# Patient Record
Sex: FEMALE | Race: WHITE | NOT HISPANIC OR LATINO
[De-identification: names, ages, dates, MRNs, and addresses within clinical notes are randomized per-mention and may not be internally consistent; named-entity substitution may affect disease eponyms.]

---

## 2023-03-10 ENCOUNTER — NON-APPOINTMENT (OUTPATIENT)
Age: 28
End: 2023-03-10

## 2023-03-16 ENCOUNTER — NON-APPOINTMENT (OUTPATIENT)
Age: 28
End: 2023-03-16

## 2023-03-17 ENCOUNTER — TRANSCRIPTION ENCOUNTER (OUTPATIENT)
Age: 28
End: 2023-03-17

## 2023-03-17 ENCOUNTER — APPOINTMENT (OUTPATIENT)
Dept: SURGERY | Facility: CLINIC | Age: 28
End: 2023-03-17
Payer: COMMERCIAL

## 2023-03-17 VITALS
TEMPERATURE: 97.1 F | HEART RATE: 62 BPM | OXYGEN SATURATION: 98 % | HEIGHT: 67 IN | WEIGHT: 212 LBS | BODY MASS INDEX: 33.27 KG/M2 | DIASTOLIC BLOOD PRESSURE: 70 MMHG | SYSTOLIC BLOOD PRESSURE: 102 MMHG

## 2023-03-17 DIAGNOSIS — K43.9 VENTRAL HERNIA W/OUT OBSTRUCTION OR GANGRENE: ICD-10-CM

## 2023-03-17 DIAGNOSIS — O16.9 UNSPECIFIED MATERNAL HYPERTENSION, UNSPECIFIED TRIMESTER: ICD-10-CM

## 2023-03-17 DIAGNOSIS — Z78.9 OTHER SPECIFIED HEALTH STATUS: ICD-10-CM

## 2023-03-17 DIAGNOSIS — Z87.19 PERSONAL HISTORY OF OTHER DISEASES OF THE DIGESTIVE SYSTEM: ICD-10-CM

## 2023-03-17 PROCEDURE — 99205 OFFICE O/P NEW HI 60 MIN: CPT

## 2023-03-20 PROBLEM — K43.9 VENTRAL HERNIA: Noted: 2023-03-17

## 2023-03-20 PROBLEM — Z87.19 HISTORY OF UMBILICAL HERNIA: Status: RESOLVED | Noted: 2023-03-17 | Resolved: 2023-03-20

## 2023-03-20 RX ORDER — NORGESTIMATE AND ETHINYL ESTRADIOL 7DAYSX3 LO
0.18/0.215/0.25 KIT ORAL
Qty: 84 | Refills: 0 | Status: ACTIVE | COMMUNITY
Start: 2023-03-13

## 2023-03-20 NOTE — REVIEW OF SYSTEMS
[Feeling Poorly] : not feeling poorly [Feeling Tired] : not feeling tired [As Noted in HPI] : as noted in HPI [Anxiety] : anxiety [Depression] : no depression [Negative] : Heme/Lymph [de-identified] : regarding this issue and visit...

## 2023-03-20 NOTE — PHYSICAL EXAM
[Respiratory Effort] : normal respiratory effort [Normal Rate and Rhythm] : normal rate and rhythm [No HSM] : no hepatosplenomegaly [Tender] : was nontender [Enlarged] : not enlarged [No Rash or Lesion] : No rash or lesion [Alert] : alert [Oriented to Person] : oriented to person [Oriented to Place] : oriented to place [Oriented to Time] : oriented to time [Anxious] : anxious [de-identified] : Appears well, no acute distress, ambulates easily into office and assumes examination table without need of assistance.  [de-identified] : Normocephalic and atraumatic.  [de-identified] : Supple with full range of motion.  [FreeTextEntry1] : No cervical, supraclavicular, axillary or inguinal adenopathy.  [de-identified] : Deferred.  [de-identified] : Epigastrium with mild diastasis.\par Periumbilical fascia with small, but incarcerated though seemingly fat containing ventral hernia.\par No overlying acute inflammatory changes.\par However, hernia tender to extended reduction efforts.\par Lower abdomen unremarkable except for redundant skin and soft tissue.\par Bilateral groins intact throughout inguinal/ femoral/ Spigelian spaces. [de-identified] : Normal external genitalia. [de-identified] : Deferred.  [de-identified] : Grossly symmetric and within normal limits without motor or sensory deficits.  [de-identified] : though appropriately so and quite pleasant overall.

## 2023-03-20 NOTE — REASON FOR VISIT
[Initial Evaluation] : an initial evaluation [Spouse] : spouse [FreeTextEntry1] : 27 year old female being seen for initial evaluation of umbilical hernia

## 2023-03-20 NOTE — HISTORY OF PRESENT ILLNESS
[de-identified] : 27 year old female presents today with concern of sensitivity and pain in the umbilicus after giving birth 7 weeks ago. Pain described as intermittent, dull, non radiating aggravated with movements while strength training and when coughing/ sneezing. Tolerating solids and liquids well. No nausea, vomiting, diarrhea or constipation. Patient believes the first pregnancy 2 years ago initiated the onset of the umbilical hernia .

## 2023-03-20 NOTE — PLAN
[FreeTextEntry1] : Symptomatic and incarcerated (seemingly fat containing) ventral hernia.  \par \par Given the associated discomfort/ pain, limitation in activities as well as her understandable anxiety with the risk of further progression in size or strangulation, at least consideration of operative intervention seems indicated and appropriate.\par \par The risks, benefits and nature of the operative intervention have been reviewed at length with Ms. LAKE , including but not limited to the possibility of bowel injury or enterotomy, the possibility of utilization of a permanent mesh, the possibility of infection which could require the future removal or replacement of any mesh, the possibility of future hernia recurrence, the approximate size and location of the estimated or typical operative incision, the likelihood of a visually noticeable “scar” and the possibility of residual abdominal wall deformity.  Ms. LAKE  is aware that all abdominal surgery maintains the possibilities of future abdominal wall reherniation, future intra-abdominal adhesions and increased risk during future operative interventions.  Ms. LAKE  is aware that medical complications unrelated to the specific operative procedure such as cardiopulmonary issues, deep venous thrombosis/ pulmonary embolism and urinary retention are possible.  Ms. LAKE  is aware that general anesthesia will be required and that appropriate care may allow for outpatient treatment or require a transition to inpatient care.\par \par Ms. LAKE  demonstrates good understanding and remains eager to proceed.  While there are no further specific concerns presently, I have encouraged follow-up with me and my office at any time in the interim to discuss any future questions.  Given the incarcerated nature of her hernia, will obtain preoperative CT scan of the abdomen and pelvis to further delineate the content and anatomy to assist in operative planning.  This operative intervention can follow any standard pre-surgical testing or medical clearance as required by the appropriate facility.\par \par Please note that more than 50% of face to face time was spent in counseling and coordination of care.\par \par \par

## 2023-03-25 ENCOUNTER — APPOINTMENT (OUTPATIENT)
Dept: CT IMAGING | Facility: CLINIC | Age: 28
End: 2023-03-25
Payer: COMMERCIAL

## 2023-03-25 ENCOUNTER — OUTPATIENT (OUTPATIENT)
Dept: OUTPATIENT SERVICES | Facility: HOSPITAL | Age: 28
LOS: 1 days | End: 2023-03-25
Payer: COMMERCIAL

## 2023-03-25 DIAGNOSIS — K43.6 OTHER AND UNSPECIFIED VENTRAL HERNIA WITH OBSTRUCTION, WITHOUT GANGRENE: ICD-10-CM

## 2023-03-25 PROCEDURE — 74177 CT ABD & PELVIS W/CONTRAST: CPT | Mod: 26

## 2023-03-25 PROCEDURE — 74177 CT ABD & PELVIS W/CONTRAST: CPT

## 2023-03-28 ENCOUNTER — NON-APPOINTMENT (OUTPATIENT)
Age: 28
End: 2023-03-28

## 2023-04-12 ENCOUNTER — APPOINTMENT (OUTPATIENT)
Dept: SURGERY | Facility: CLINIC | Age: 28
End: 2023-04-12
Payer: COMMERCIAL

## 2023-04-12 VITALS
HEART RATE: 83 BPM | HEIGHT: 67 IN | WEIGHT: 212 LBS | DIASTOLIC BLOOD PRESSURE: 80 MMHG | BODY MASS INDEX: 33.27 KG/M2 | TEMPERATURE: 98.1 F | OXYGEN SATURATION: 98 % | SYSTOLIC BLOOD PRESSURE: 120 MMHG

## 2023-04-12 PROCEDURE — 99215 OFFICE O/P EST HI 40 MIN: CPT

## 2023-04-12 NOTE — HISTORY OF PRESENT ILLNESS
[de-identified] : 27 year old female presents today with concern of sensitivity and pain in the umbilicus after giving birth 7 weeks ago. Pain described as intermittent, dull, non radiating aggravated with movements while strength training and when coughing/ sneezing. Tolerating solids and liquids well. No nausea, vomiting, diarrhea or constipation. Patient believes the first pregnancy 2 years ago initiated the onset of the umbilical hernia .  [de-identified] : Very pleasant young lady returning to the office with her  under my direction.\par Ms. Rea has a previously diagnosed umbilical hernia.\par She went for a CT scan of the abdomen and pelvis following our initial consultation.\par Ms. Rea continues to complain of low grade but persistent localized discomfort.\par She continues to deny any obviously associated systemic, GI or  complaints...

## 2023-04-12 NOTE — PLAN
[FreeTextEntry1] : Symptomatic and incarcerated ventral hernia- CT scan report and images personally reviewed and discussed with patient and  in detail.  While a cyst is possible, I suspect a small chronically incarcerated ventral hernia with either retained peritoneal fluid or reactive fluid from chronic incarceration.  \par \par Given the associated discomfort, limitation in activity as well as her understandable anxiety with the risk of further persistence, consideration of operative intervention seems appropriate.\par \par The risks, benefits and nature of the operation have been reviewed at length with Ms. LAKE , including but not limited to the possibility of bowel injury or enterotomy, the possibility of utilization of a permanent mesh, the possibility of infection which could require the future removal or replacement of any mesh, the possibility of future hernia recurrence, the approximate size and location of the estimated or typical operative incision, the likelihood of a visually noticeable “scar” and the possibility of residual abdominal wall deformity.  \par \par Ms. LAKE  is aware that all abdominal surgery maintains the possibilities of future abdominal wall reherniation, future intra-abdominal adhesions and increased risk during future operative interventions.  Ms. LAKE  is aware that medical complications unrelated to the specific operative procedure such as cardiopulmonary issues, deep venous thrombosis/ pulmonary embolism and urinary retention are possible.  \par \par Ms. LAKE  is aware that general anesthesia will be required and that appropriate care may allow for outpatient treatment or require a transition to inpatient care.\par \par She understands that all excised tissue will be submitted to Pathology which may be of further future use in resolving the concern for hernia versus cyst.\par \par Ms. LAKE  demonstrates good understanding and remains eager to proceed.  She specifically defers further observation with watchful waiting.\par \par While she and her  have no further specific concerns presently, I have encouraged follow-up with me and my office at any time in the interim to discuss any future questions.  \par \par This operative intervention can follow any standard pre-surgical testing or medical clearance as required by the appropriate facility.\par \par Please note that more than 50% of face to face time was spent in counseling and coordination of care.\par \par \par

## 2023-04-12 NOTE — REASON FOR VISIT
[Follow-Up: _____] : a [unfilled] follow-up visit [Spouse] : spouse [FreeTextEntry1] : 27 year old female being seen for initial evaluation of umbilical hernia

## 2023-04-12 NOTE — DATA REVIEWED
[FreeTextEntry1] : EXAM: [id number] - CT ABDOMEN AND PELVIS IC  - ORDERED BY: ARSALAN CHAVIRA\par \par PROCEDURE DATE:  03/25/2023  \par  \par INTERPRETATION:  CLINICAL INFORMATION: Incarcerated Ventral Hernia.; \par Ordering Dxs: K43.6 Other and unspecified ventral hernia with \par obstruction, without gangrene /// Admitting Dxs: K43.6 OTHER AND UNSP \par VENTRAL HERNIA WITH OBSTRUCTION, W/O GANGRENE QCT\par \par COMPARISON: None.\par \par CONTRAST/COMPLICATIONS:\par IV Contrast: Omnipaque 350  90 cc administered   10 cc discarded\par Oral Contrast: Smoothie Readi-Cat 2\par Complications: None reported at time of study completion\par \par PROCEDURE:\par CT of the Abdomen and Pelvis was performed.\par Sagittal and coronal reformats were performed.\par \par FINDINGS:\par LOWER CHEST: Within normal limits.\par LIVER: Within normal limits.\par BILE DUCTS: Normal caliber.\par GALLBLADDER: Within normal limits.\par SPLEEN: Within normal limits.\par PANCREAS: Within normal limits.\par ADRENALS: Within normal limits.\par KIDNEYS/URETERS: Within normal limits.\par BLADDER: Within normal limits.\par REPRODUCTIVE ORGANS: Within normal limits.\par BOWEL: No bowel obstruction. The appendix is normal.\par PERITONEUM: No ascites.\par VESSELS:  Within normal limits.\par RETROPERITONEUM/LYMPH NODES: No lymphadenopathy.\par ABDOMINAL WALL: There is a 1.8 x 1.4 cm umbilical cyst. There is a \par subjacent 4 mm cyst along the surface of the peritoneum on series 2 image \par 81.\par BONES: Within normal limits.\par \par IMPRESSION: A small umbilical cyst. A subjacent 4 mm cyst at the \par peritoneum. These findings are of uncertain clinical significance.\par \par No ventral hernia.\par \par --- End of Report ---\par \par BRIANNA WALKER MD; Attending Radiologist \par This document has been electronically signed. Mar 28 2023  1:39PM\par

## 2023-04-12 NOTE — REVIEW OF SYSTEMS
[Feeling Poorly] : not feeling poorly [Feeling Tired] : not feeling tired [As Noted in HPI] : as noted in HPI [Anxiety] : anxiety [Depression] : no depression [Negative] : Heme/Lymph [de-identified] : regarding this issue and visit, albeit less so...

## 2023-04-12 NOTE — PHYSICAL EXAM
[Respiratory Effort] : normal respiratory effort [Normal Rate and Rhythm] : normal rate and rhythm [No HSM] : no hepatosplenomegaly [Tender] : was nontender [Enlarged] : not enlarged [No Rash or Lesion] : No rash or lesion [Alert] : alert [Oriented to Person] : oriented to person [Oriented to Place] : oriented to place [Oriented to Time] : oriented to time [Anxious] : anxious [de-identified] : Appears well, no acute distress, ambulates easily into the office.  [de-identified] : Remains normocephalic and atraumatic.  [de-identified] : Still supple with full range of motion.  [FreeTextEntry1] : No palpable cervical, supraclavicular, axillary or inguinal adenopathy.  [de-identified] : Deferred.  [de-identified] : Epigastrium with mild diastasis- stable.\par Periumbilical fascia with small, but incarcerated though seemingly fat containing ventral hernia versus cystic collection.\par No overlying acute inflammatory change.\par However, hernia +/ collection tender to extended reduction efforts.\par Lower abdomen unremarkable except for redundant skin and soft tissue.\par Bilateral groins intact on Valsalva. [de-identified] : Deferred.  [de-identified] : Deferred.  [de-identified] : Grossly symmetric and within normal limits without motor or sensory deficit.  [de-identified] : though less so and quite pleasant.

## 2023-05-05 ENCOUNTER — OUTPATIENT (OUTPATIENT)
Dept: OUTPATIENT SERVICES | Facility: HOSPITAL | Age: 28
LOS: 1 days | End: 2023-05-05
Payer: COMMERCIAL

## 2023-05-05 VITALS
WEIGHT: 214.95 LBS | OXYGEN SATURATION: 98 % | TEMPERATURE: 97 F | DIASTOLIC BLOOD PRESSURE: 80 MMHG | HEIGHT: 67 IN | SYSTOLIC BLOOD PRESSURE: 123 MMHG

## 2023-05-05 DIAGNOSIS — K43.6 OTHER AND UNSPECIFIED VENTRAL HERNIA WITH OBSTRUCTION, WITHOUT GANGRENE: ICD-10-CM

## 2023-05-05 DIAGNOSIS — Z01.818 ENCOUNTER FOR OTHER PREPROCEDURAL EXAMINATION: ICD-10-CM

## 2023-05-05 LAB
HCT VFR BLD CALC: 38.5 % — SIGNIFICANT CHANGE UP (ref 34.5–45)
HGB BLD-MCNC: 13 G/DL — SIGNIFICANT CHANGE UP (ref 11.5–15.5)
MCHC RBC-ENTMCNC: 29 PG — SIGNIFICANT CHANGE UP (ref 27–34)
MCHC RBC-ENTMCNC: 33.8 GM/DL — SIGNIFICANT CHANGE UP (ref 32–36)
MCV RBC AUTO: 85.7 FL — SIGNIFICANT CHANGE UP (ref 80–100)
NRBC # BLD: 0 /100 WBCS — SIGNIFICANT CHANGE UP (ref 0–0)
PLATELET # BLD AUTO: 207 K/UL — SIGNIFICANT CHANGE UP (ref 150–400)
RBC # BLD: 4.49 M/UL — SIGNIFICANT CHANGE UP (ref 3.8–5.2)
RBC # FLD: 12.7 % — SIGNIFICANT CHANGE UP (ref 10.3–14.5)
WBC # BLD: 6.22 K/UL — SIGNIFICANT CHANGE UP (ref 3.8–10.5)
WBC # FLD AUTO: 6.22 K/UL — SIGNIFICANT CHANGE UP (ref 3.8–10.5)

## 2023-05-05 PROCEDURE — G0463: CPT

## 2023-05-05 PROCEDURE — 36415 COLL VENOUS BLD VENIPUNCTURE: CPT

## 2023-05-05 PROCEDURE — 85027 COMPLETE CBC AUTOMATED: CPT

## 2023-05-05 NOTE — H&P PST ADULT - ASSESSMENT
26 y/o female with ventral hernia  Planned surgtery.-open repair Ventral hernia     Pre op instructions provided  Instructions provided on medications to continue and to take the day morning of surgery

## 2023-05-05 NOTE — H&P PST ADULT - CONSTITUTIONAL
Medicare Wellness Visit patient outreach outcome:  Attempted outreach and message left     Kade Caldwell  [facility] Asst  [phone]     well-groomed

## 2023-05-12 ENCOUNTER — TRANSCRIPTION ENCOUNTER (OUTPATIENT)
Age: 28
End: 2023-05-12

## 2023-05-12 NOTE — ASU DISCHARGE PLAN (ADULT/PEDIATRIC) - NS MD DC FALL RISK RISK
For information on Fall & Injury Prevention, visit: [email or web address] OR  [email or web address] OR  https://www.cdc.gov/steadi/patient.html

## 2023-05-12 NOTE — ASU DISCHARGE PLAN (ADULT/PEDIATRIC) - ASU DC SPECIAL INSTRUCTIONSFT
REST and relax!    Apply ice packs for 30 minutes on/30 minutes off every hour while awake for next 48 hours.  May take Tylenol for minor pain.  Please minimize any Aspirin, Advil or Motrin for the next 48 hours.  A prescription for pain medication has been forwarded to your pharmacy.  Take an over-the-counter stool softener. like COLACE. twice daily to prevent constipation.  You may shower and remove any outer dressings after 48 hours, though the Steri-Strips should remain in place.    Call for any questions or concerns! REST and relax!    Apply ice packs for 30 minutes on/ 30 minutes off every hour while awake for next 48 hours.  May take Tylenol for minor pain.  May take Advil or Motrin for moderate pain.  A script for pain medication has been forwarded to your pharmacy.  Please call office for uncontrolled pain.  Take an over the counter stool softener like COLACE twice daily to prevent constipation.  You may shower and remove any outer dressings after 48 hours.   Leave Steri Strips in place.  Call for any questions or concerns!

## 2023-05-12 NOTE — ASU DISCHARGE PLAN (ADULT/PEDIATRIC) - PROVIDER TOKENS
PROVIDER:[TOKEN:[[id number]],FOLLOWUP:[1 week]] PROVIDER:[TOKEN:[[id number]],FOLLOWUP:[2 weeks]]

## 2023-05-12 NOTE — ASU DISCHARGE PLAN (ADULT/PEDIATRIC) - CARE PROVIDER_API CALL
Edmond Brennna)  Surgery  [address], [place] [id number]  Phone: [phone]  Fax: [phone]  Follow Up Time: 1 week   Edmond Brennan)  Surgery  [address], [place] [id number]  Phone: [phone]  Fax: [phone]  Follow Up Time: 2 weeks

## 2023-05-22 ENCOUNTER — TRANSCRIPTION ENCOUNTER (OUTPATIENT)
Age: 28
End: 2023-05-22

## 2023-05-23 ENCOUNTER — RESULT REVIEW (OUTPATIENT)
Age: 28
End: 2023-05-23

## 2023-05-23 ENCOUNTER — OUTPATIENT (OUTPATIENT)
Dept: OUTPATIENT SERVICES | Facility: HOSPITAL | Age: 28
LOS: 1 days | End: 2023-05-23
Payer: COMMERCIAL

## 2023-05-23 ENCOUNTER — APPOINTMENT (OUTPATIENT)
Dept: SURGERY | Facility: HOSPITAL | Age: 28
End: 2023-05-23

## 2023-05-23 VITALS
WEIGHT: 213.85 LBS | RESPIRATION RATE: 17 BRPM | DIASTOLIC BLOOD PRESSURE: 64 MMHG | HEART RATE: 68 BPM | HEIGHT: 67 IN | TEMPERATURE: 98 F | OXYGEN SATURATION: 100 % | SYSTOLIC BLOOD PRESSURE: 124 MMHG

## 2023-05-23 VITALS — SYSTOLIC BLOOD PRESSURE: 120 MMHG | DIASTOLIC BLOOD PRESSURE: 67 MMHG

## 2023-05-23 DIAGNOSIS — K43.6 OTHER AND UNSPECIFIED VENTRAL HERNIA WITH OBSTRUCTION, WITHOUT GANGRENE: ICD-10-CM

## 2023-05-23 LAB — HCG UR QL: NEGATIVE — SIGNIFICANT CHANGE UP

## 2023-05-23 PROCEDURE — 49592 RPR AA HRN 1ST < 3 NCR/STRN: CPT

## 2023-05-23 PROCEDURE — 88302 TISSUE EXAM BY PATHOLOGIST: CPT | Mod: 26

## 2023-05-23 PROCEDURE — 88302 TISSUE EXAM BY PATHOLOGIST: CPT

## 2023-05-23 PROCEDURE — 49592 RPR AA HRN 1ST < 3 NCR/STRN: CPT | Mod: AS

## 2023-05-23 PROCEDURE — 81025 URINE PREGNANCY TEST: CPT

## 2023-05-23 RX ORDER — OXYCODONE HYDROCHLORIDE 5 MG/1
1 TABLET ORAL
Qty: 30 | Refills: 0
Start: 2023-05-23

## 2023-05-23 RX ORDER — NORGESTIMATE AND ETHINYL ESTRADIOL 7DAYSX3 LO
1 KIT ORAL
Refills: 0 | DISCHARGE

## 2023-05-23 RX ORDER — HYDROMORPHONE HYDROCHLORIDE 2 MG/ML
0.25 INJECTION INTRAMUSCULAR; INTRAVENOUS; SUBCUTANEOUS
Refills: 0 | Status: DISCONTINUED | OUTPATIENT
Start: 2023-05-23 | End: 2023-05-24

## 2023-05-23 RX ORDER — CEFAZOLIN SODIUM 1 G
2000 VIAL (EA) INJECTION ONCE
Refills: 0 | Status: COMPLETED | OUTPATIENT
Start: 2023-05-23 | End: 2023-05-23

## 2023-05-23 RX ORDER — SODIUM CHLORIDE 9 MG/ML
1000 INJECTION, SOLUTION INTRAVENOUS
Refills: 0 | Status: DISCONTINUED | OUTPATIENT
Start: 2023-05-23 | End: 2023-05-24

## 2023-05-23 RX ORDER — CHLORHEXIDINE GLUCONATE 213 G/1000ML
1 SOLUTION TOPICAL ONCE
Refills: 0 | Status: COMPLETED | OUTPATIENT
Start: 2023-05-23 | End: 2023-05-23

## 2023-05-23 RX ORDER — HYDROMORPHONE HYDROCHLORIDE 2 MG/ML
0.5 INJECTION INTRAMUSCULAR; INTRAVENOUS; SUBCUTANEOUS
Refills: 0 | Status: DISCONTINUED | OUTPATIENT
Start: 2023-05-23 | End: 2023-05-24

## 2023-05-23 RX ORDER — ONDANSETRON 8 MG/1
4 TABLET, FILM COATED ORAL ONCE
Refills: 0 | Status: DISCONTINUED | OUTPATIENT
Start: 2023-05-23 | End: 2023-05-24

## 2023-05-23 RX ADMIN — SODIUM CHLORIDE 75 MILLILITER(S): 9 INJECTION, SOLUTION INTRAVENOUS at 16:40

## 2023-05-23 RX ADMIN — CHLORHEXIDINE GLUCONATE 1 APPLICATION(S): 213 SOLUTION TOPICAL at 13:51

## 2023-05-23 NOTE — BRIEF OPERATIVE NOTE - NSICDXBRIEFPROCEDURE_GEN_ALL_CORE_FT
PROCEDURES:  Repair, hernia, nonreducible, abdominal wall, anterior, without history of prior repair, less than 3 cm, with mesh insertion 23-May-2023 16:12:31 Open Repair of Incarcerated Ventral Hernia WITHOUT Mesh. Edmond Brennan J

## 2023-06-07 ENCOUNTER — APPOINTMENT (OUTPATIENT)
Dept: SURGERY | Facility: CLINIC | Age: 28
End: 2023-06-07
Payer: COMMERCIAL

## 2023-06-07 VITALS
WEIGHT: 216.49 LBS | TEMPERATURE: 97 F | DIASTOLIC BLOOD PRESSURE: 74 MMHG | BODY MASS INDEX: 33.98 KG/M2 | HEIGHT: 67 IN | SYSTOLIC BLOOD PRESSURE: 120 MMHG | HEART RATE: 71 BPM | OXYGEN SATURATION: 99 %

## 2023-06-07 DIAGNOSIS — K43.6 OTHER AND UNSPECIFIED VENTRAL HERNIA WITH OBSTRUCTION, W/OUT GANGRENE: ICD-10-CM

## 2023-06-07 DIAGNOSIS — Z98.890 OTHER SPECIFIED POSTPROCEDURAL STATES: ICD-10-CM

## 2023-06-07 DIAGNOSIS — Z87.19 OTHER SPECIFIED POSTPROCEDURAL STATES: ICD-10-CM

## 2023-06-07 PROCEDURE — 99214 OFFICE O/P EST MOD 30 MIN: CPT

## 2023-06-07 RX ORDER — MULTIVITAMIN
TABLET ORAL DAILY
Refills: 0 | Status: ACTIVE | COMMUNITY

## 2023-06-07 NOTE — DATA REVIEWED
[FreeTextEntry1] : Patient: GABRIELLA LAKE   Accession: [id number]\par Collected Date/Time:                   5/23/2023 15:25 EDT\par Received Date/Time:                    5/24/2023 09:29 EDT\par Surgical Pathology Report - Auth (Verified)\par Specimen(s) Submitted\par Portion of incarcerated ventral hernia sac and contents\par Final Diagnosis\par Soft tissue (portion of incarcerated ventral hernia sac and contents):\par -   Fibrotic hernia sac with congestion and areas of metaplastic squamous epithelial\par lining.\par -   Additional dense fibrous tissue and extensively congested fibroadipose tissue.\par Verified by: DASH WORLEY (Electronic Signature)\par Reported on: 05/25/23 12:17 EDT, [facility], [place], [address] [place] [id number]\par _________________________________________________________________

## 2023-06-07 NOTE — PHYSICAL EXAM
[Respiratory Effort] : normal respiratory effort [Normal Rate and Rhythm] : normal rate and rhythm [No HSM] : no hepatosplenomegaly [No Rash or Lesion] : No rash or lesion [Alert] : alert [Oriented to Person] : oriented to person [Oriented to Place] : oriented to place [Oriented to Time] : oriented to time [Tender] : was nontender [Enlarged] : not enlarged [Calm] : calm [de-identified] : Appears well, no acute distress, ambulates easily into office.  [de-identified] : Remains normocephalic and atraumatic, as per baseline. [de-identified] : Still supple with full range of motion, as per usual. [FreeTextEntry1] : No palpable or appreciable cervical, supraclavicular, axillary or inguinal adenopathy.  [de-identified] : Deferred.  [de-identified] : Epigastrium with mild diastasis- all stable.\par Periumbilical incision healing well:.\par -clean and dry and intact\par -no expressible drainage or appreciable odor\par -mild post operative edema and ecchymoses\par -no overlying or surrounding inflammatory changes\par -no SQ collections\par -fascial repair intact on cough and Valsalva in upright and supine positions [de-identified] : Deferred.  [de-identified] : Deferred.  [de-identified] : Grossly symmetric and within normal limits without motor or sensory deficits.

## 2023-06-07 NOTE — PLAN
[FreeTextEntry1] : S/P uneventful general anesthesia with uncomplicated open repair of incarcerated ventral hernia without mesh.\par Ms. LAKE is clinically well by this history and surgically stable by this exam.\par No evidence by history or exam to suggest complication.\par Ms. LAKE  is cleared to resume casual activities with own comfort as guide.\par To refrain from maximal exertions for another month.\par The timing and technique of scar massage reviewed preliminarily with Ms. LAKE .\par The Pathology report and it's benign nature was reviewed in detail.\par Ms. LAKE  has been encouraged to call with questions or concerns.  \par Otherwise, RTO in one month to ensure resolution of soft tissue changes\par Ms. LAKE is pleased and agrees, leaving in good spirits able to verbalize instructions as outlined above. \par Please note that more than 50% of face to face time was spent in counseling and coordination of care.

## 2023-06-07 NOTE — HISTORY OF PRESENT ILLNESS
[de-identified] : 27 year old female presents today with concern of sensitivity and pain in the umbilicus after giving birth 7 weeks ago. Pain described as intermittent, dull, non radiating aggravated with movements while strength training and when coughing/ sneezing. Tolerating solids and liquids well. No nausea, vomiting, diarrhea or constipation. Patient believes the first pregnancy 2 years ago initiated the onset of the umbilical hernia .  [de-identified] : Very pleasant young lady returning to the office for routine follow-up care.\par Ms. LAKE is now s/p ambulatory open repair of her incarcerated ventral hernia without mesh.\par She is pleased with her progress and believes that she has done well.\par Ms. LAKE reports only minimal residual incisional pain and no jaime abdominal pain.\par She is pleased with the appearance of her incision and the overall resultant contour of her abdominal wall.\par Ms. LAKE denies any ongoing or associated GI or  complaints...

## 2023-07-12 ENCOUNTER — APPOINTMENT (OUTPATIENT)
Dept: SURGERY | Facility: CLINIC | Age: 28
End: 2023-07-12

## (undated) DEVICE — VENODYNE/SCD SLEEVE CALF MEDIUM

## (undated) DEVICE — DRAPE TOWEL BLUE 17" X 24"

## (undated) DEVICE — PACK MINOR NO DRAPE

## (undated) DEVICE — GLV 8 PROTEXIS (BLUE)

## (undated) DEVICE — SUT SURGIPRO II 2-0 30" GS-21

## (undated) DEVICE — NDL HYPO SAFE 22G X 1.5" (BLACK)

## (undated) DEVICE — SUT POLYSORB 3-0 30" V-20 UNDYED

## (undated) DEVICE — DRAPE CHEST BREAST 106" X 122"

## (undated) DEVICE — SOL IRR POUR H2O 1000ML

## (undated) DEVICE — DRSG STERISTRIPS 0.5 X 4"

## (undated) DEVICE — SOL IRR POUR NS 0.9% 1000ML

## (undated) DEVICE — DRAPE 3/4 SHEET 52X76"

## (undated) DEVICE — DRSG CURITY GAUZE SPONGE 4 X 4" 12-PLY

## (undated) DEVICE — WARMING BLANKET UPPER ADULT

## (undated) DEVICE — GOWN TRIMAX LG

## (undated) DEVICE — SYR LUER LOK 10CC

## (undated) DEVICE — GLV 7.5 PROTEXIS (WHITE)